# Patient Record
Sex: MALE
[De-identification: names, ages, dates, MRNs, and addresses within clinical notes are randomized per-mention and may not be internally consistent; named-entity substitution may affect disease eponyms.]

---

## 2022-10-13 ENCOUNTER — RX ONLY (OUTPATIENT)
Age: 42
Setting detail: RX ONLY
End: 2022-10-13

## 2023-02-13 ENCOUNTER — RX ONLY (OUTPATIENT)
Age: 43
Setting detail: RX ONLY
End: 2023-02-13

## 2023-07-14 ENCOUNTER — APPOINTMENT (RX ONLY)
Dept: URBAN - METROPOLITAN AREA CLINIC 138 | Facility: CLINIC | Age: 43
Setting detail: DERMATOLOGY
End: 2023-07-14

## 2023-07-14 VITALS — HEIGHT: 64 IN | WEIGHT: 270 LBS

## 2023-07-14 DIAGNOSIS — B07.8 OTHER VIRAL WARTS: ICD-10-CM

## 2023-07-14 PROCEDURE — ? COUNSELING

## 2023-07-14 PROCEDURE — ? PRESCRIPTION

## 2023-07-14 PROCEDURE — ? LIQUID NITROGEN

## 2023-07-14 PROCEDURE — 99202 OFFICE O/P NEW SF 15 MIN: CPT | Mod: 25

## 2023-07-14 PROCEDURE — 17110 DESTRUCTION B9 LES UP TO 14: CPT

## 2023-07-14 RX ORDER — IMIQUIMOD 12.5 MG/.25G
CREAM TOPICAL
Qty: 12 | Refills: 1 | Status: ERX | COMMUNITY
Start: 2023-07-14

## 2023-07-14 RX ADMIN — IMIQUIMOD: 12.5 CREAM TOPICAL at 00:00

## 2023-07-14 ASSESSMENT — LOCATION DETAILED DESCRIPTION DERM
LOCATION DETAILED: RIGHT SUPERIOR LATERAL SCROTUM
LOCATION DETAILED: LEFT ANTERIOR SCROTUM
LOCATION DETAILED: LEFT SUPERIOR LATERAL SCROTUM
LOCATION DETAILED: RIGHT INFERIOR LATERAL SCROTUM
LOCATION DETAILED: SUPRAPUBIC SKIN
LOCATION DETAILED: RIGHT INFERIOR MEDIAL SCROTUM
LOCATION DETAILED: LEFT INFERIOR LATERAL SCROTUM

## 2023-07-14 ASSESSMENT — LOCATION ZONE DERM
LOCATION ZONE: TRUNK
LOCATION ZONE: GENITALIA
LOCATION ZONE: SCROTUM

## 2023-07-14 ASSESSMENT — LOCATION SIMPLE DESCRIPTION DERM
LOCATION SIMPLE: RIGHT SCROTUM
LOCATION SIMPLE: GROIN
LOCATION SIMPLE: LEFT SCROTUM
LOCATION SIMPLE: SCROTUM

## 2023-07-14 NOTE — PROCEDURE: LIQUID NITROGEN
Spray Paint Technique: No
Spray Paint Text: The liquid nitrogen was applied to the skin utilizing a spray paint frosting technique.
Duration Of Freeze Thaw-Cycle (Seconds): 5-10
Show Spray Paint Technique Variable?: Yes
Consent: The patient's consent was obtained including but not limited to risks of crusting, scabbing, blistering, scarring, darker or lighter pigmentary change, recurrence, incomplete removal and infection.
Number Of Freeze-Thaw Cycles: 2 freeze-thaw cycles
Post-Care Instructions: I reviewed with the patient in detail post-care instructions. Patient is to wear sunprotection, and avoid picking at any of the treated lesions. Pt may apply Vaseline to crusted or scabbing areas.
Detail Level: Detailed
Medical Necessity Clause: This procedure was medically necessary because the lesions that were treated were:
Medical Necessity Information: It is in your best interest to select a reason for this procedure from the list below. All of these items fulfill various CMS LCD requirements except the new and changing color options.

## 2023-08-16 ENCOUNTER — RX ONLY (OUTPATIENT)
Age: 43
Setting detail: RX ONLY
End: 2023-08-16

## 2023-08-17 ENCOUNTER — APPOINTMENT (RX ONLY)
Dept: URBAN - METROPOLITAN AREA CLINIC 138 | Facility: CLINIC | Age: 43
Setting detail: DERMATOLOGY
End: 2023-08-17

## 2023-08-17 VITALS — WEIGHT: 270 LBS | HEIGHT: 64 IN

## 2023-08-17 DIAGNOSIS — B07.8 OTHER VIRAL WARTS: ICD-10-CM | Status: STABLE

## 2023-08-17 PROCEDURE — ? LIQUID NITROGEN

## 2023-08-17 PROCEDURE — 17110 DESTRUCTION B9 LES UP TO 14: CPT

## 2023-08-17 PROCEDURE — ? PRESCRIPTION

## 2023-08-17 RX ORDER — IMIQUIMOD 12.5 MG/.25G
CREAM TOPICAL
Qty: 12 | Refills: 1 | Status: ERX

## 2023-08-17 ASSESSMENT — LOCATION DETAILED DESCRIPTION DERM
LOCATION DETAILED: RIGHT INFERIOR MEDIAL SCROTUM
LOCATION DETAILED: RIGHT SUPERIOR LATERAL SCROTUM
LOCATION DETAILED: RIGHT INFERIOR LATERAL SCROTUM
LOCATION DETAILED: LEFT SUPERIOR LATERAL SCROTUM
LOCATION DETAILED: LEFT INFERIOR LATERAL SCROTUM

## 2023-08-17 ASSESSMENT — LOCATION SIMPLE DESCRIPTION DERM
LOCATION SIMPLE: LEFT SCROTUM
LOCATION SIMPLE: RIGHT SCROTUM

## 2023-08-17 ASSESSMENT — LOCATION ZONE DERM: LOCATION ZONE: SCROTUM

## 2023-10-19 ENCOUNTER — APPOINTMENT (RX ONLY)
Dept: URBAN - METROPOLITAN AREA CLINIC 138 | Facility: CLINIC | Age: 43
Setting detail: DERMATOLOGY
End: 2023-10-19

## 2023-10-19 VITALS — WEIGHT: 270 LBS | HEIGHT: 64 IN

## 2023-10-19 DIAGNOSIS — B07.8 OTHER VIRAL WARTS: ICD-10-CM

## 2023-10-19 DIAGNOSIS — L50.9 URTICARIA, UNSPECIFIED: ICD-10-CM

## 2023-10-19 PROCEDURE — ? COUNSELING

## 2023-10-19 PROCEDURE — ? LIQUID NITROGEN

## 2023-10-19 PROCEDURE — 99213 OFFICE O/P EST LOW 20 MIN: CPT | Mod: 25

## 2023-10-19 PROCEDURE — ? TREATMENT REGIMEN

## 2023-10-19 PROCEDURE — ? PATIENT SPECIFIC COUNSELING

## 2023-10-19 PROCEDURE — 17110 DESTRUCTION B9 LES UP TO 14: CPT

## 2023-10-19 ASSESSMENT — LOCATION DETAILED DESCRIPTION DERM
LOCATION DETAILED: RIGHT SUPERIOR LATERAL SCROTUM
LOCATION DETAILED: LEFT DISTAL DORSAL FOREARM
LOCATION DETAILED: LEFT INFERIOR LATERAL SCROTUM
LOCATION DETAILED: LEFT SUPERIOR LATERAL SCROTUM
LOCATION DETAILED: LEFT VENTRAL PROXIMAL FOREARM
LOCATION DETAILED: RIGHT PROXIMAL DORSAL FOREARM
LOCATION DETAILED: RIGHT INFERIOR LATERAL SCROTUM
LOCATION DETAILED: RIGHT INFERIOR MEDIAL SCROTUM
LOCATION DETAILED: LEFT PROXIMAL DORSAL FOREARM

## 2023-10-19 ASSESSMENT — LOCATION ZONE DERM
LOCATION ZONE: SCROTUM
LOCATION ZONE: ARM

## 2023-10-19 ASSESSMENT — LOCATION SIMPLE DESCRIPTION DERM
LOCATION SIMPLE: LEFT SCROTUM
LOCATION SIMPLE: RIGHT SCROTUM
LOCATION SIMPLE: RIGHT FOREARM
LOCATION SIMPLE: LEFT FOREARM

## 2023-10-19 NOTE — PROCEDURE: COUNSELING
Detail Level: Detailed
Patient Specific Counseling (Will Not Stick From Patient To Patient): cetrizine tid